# Patient Record
Sex: FEMALE | Race: WHITE | NOT HISPANIC OR LATINO
[De-identification: names, ages, dates, MRNs, and addresses within clinical notes are randomized per-mention and may not be internally consistent; named-entity substitution may affect disease eponyms.]

---

## 2017-05-04 ENCOUNTER — APPOINTMENT (OUTPATIENT)
Dept: SURGICAL ONCOLOGY | Facility: CLINIC | Age: 57
End: 2017-05-04

## 2017-05-04 VITALS
BODY MASS INDEX: 27.46 KG/M2 | DIASTOLIC BLOOD PRESSURE: 80 MMHG | HEIGHT: 63 IN | HEART RATE: 63 BPM | WEIGHT: 155 LBS | SYSTOLIC BLOOD PRESSURE: 118 MMHG

## 2017-05-04 DIAGNOSIS — T80.92XA UNSPECIFIED TRANSFUSION REACTION, INITIAL ENCOUNTER: ICD-10-CM

## 2017-05-04 DIAGNOSIS — M79.1 MYALGIA: ICD-10-CM

## 2017-05-04 DIAGNOSIS — H26.9 UNSPECIFIED CATARACT: ICD-10-CM

## 2017-05-04 DIAGNOSIS — E07.9 DISORDER OF THYROID, UNSPECIFIED: ICD-10-CM

## 2017-05-04 DIAGNOSIS — K44.9 DIAPHRAGMATIC HERNIA W/OUT OBSTRUCTION OR GANGRENE: ICD-10-CM

## 2017-05-04 DIAGNOSIS — D68.9 COAGULATION DEFECT, UNSPECIFIED: ICD-10-CM

## 2017-05-04 DIAGNOSIS — C44.90 UNSPECIFIED MALIGNANT NEOPLASM OF SKIN, UNSPECIFIED: ICD-10-CM

## 2017-05-04 DIAGNOSIS — Z87.01 PERSONAL HISTORY OF PNEUMONIA (RECURRENT): ICD-10-CM

## 2017-05-04 DIAGNOSIS — Z78.9 OTHER SPECIFIED HEALTH STATUS: ICD-10-CM

## 2017-05-04 DIAGNOSIS — N15.9 RENAL TUBULO-INTERSTITIAL DISEASE, UNSPECIFIED: ICD-10-CM

## 2017-05-04 DIAGNOSIS — R68.89 OTHER GENERAL SYMPTOMS AND SIGNS: ICD-10-CM

## 2017-07-19 ENCOUNTER — FORM ENCOUNTER (OUTPATIENT)
Age: 57
End: 2017-07-19

## 2017-07-20 ENCOUNTER — APPOINTMENT (OUTPATIENT)
Dept: ULTRASOUND IMAGING | Facility: CLINIC | Age: 57
End: 2017-07-20

## 2017-07-20 ENCOUNTER — APPOINTMENT (OUTPATIENT)
Dept: MAMMOGRAPHY | Facility: CLINIC | Age: 57
End: 2017-07-20

## 2017-07-20 ENCOUNTER — OUTPATIENT (OUTPATIENT)
Dept: OUTPATIENT SERVICES | Facility: HOSPITAL | Age: 57
LOS: 1 days | End: 2017-07-20
Payer: COMMERCIAL

## 2017-07-20 DIAGNOSIS — Z00.8 ENCOUNTER FOR OTHER GENERAL EXAMINATION: ICD-10-CM

## 2017-07-20 PROCEDURE — 77063 BREAST TOMOSYNTHESIS BI: CPT

## 2017-07-20 PROCEDURE — 77067 SCR MAMMO BI INCL CAD: CPT

## 2017-07-20 PROCEDURE — 76641 ULTRASOUND BREAST COMPLETE: CPT

## 2017-08-07 ENCOUNTER — APPOINTMENT (OUTPATIENT)
Dept: SURGICAL ONCOLOGY | Facility: CLINIC | Age: 57
End: 2017-08-07
Payer: COMMERCIAL

## 2017-08-07 VITALS
WEIGHT: 158 LBS | OXYGEN SATURATION: 96 % | HEIGHT: 63 IN | BODY MASS INDEX: 28 KG/M2 | HEART RATE: 73 BPM | RESPIRATION RATE: 15 BRPM | DIASTOLIC BLOOD PRESSURE: 73 MMHG | SYSTOLIC BLOOD PRESSURE: 117 MMHG

## 2017-08-07 PROCEDURE — 99214 OFFICE O/P EST MOD 30 MIN: CPT

## 2017-11-27 ENCOUNTER — APPOINTMENT (OUTPATIENT)
Dept: ULTRASOUND IMAGING | Facility: HOSPITAL | Age: 57
End: 2017-11-27
Payer: COMMERCIAL

## 2017-11-27 ENCOUNTER — OUTPATIENT (OUTPATIENT)
Dept: OUTPATIENT SERVICES | Facility: HOSPITAL | Age: 57
LOS: 1 days | End: 2017-11-27
Payer: COMMERCIAL

## 2017-11-27 PROCEDURE — 76642 ULTRASOUND BREAST LIMITED: CPT

## 2017-11-27 PROCEDURE — 76642 ULTRASOUND BREAST LIMITED: CPT | Mod: 26,LT

## 2017-11-29 DIAGNOSIS — N60.02 SOLITARY CYST OF LEFT BREAST: ICD-10-CM

## 2017-12-11 ENCOUNTER — APPOINTMENT (OUTPATIENT)
Dept: SURGICAL ONCOLOGY | Facility: CLINIC | Age: 57
End: 2017-12-11

## 2018-03-02 ENCOUNTER — APPOINTMENT (OUTPATIENT)
Dept: DERMATOLOGY | Facility: CLINIC | Age: 58
End: 2018-03-02
Payer: COMMERCIAL

## 2018-03-02 DIAGNOSIS — D18.00 HEMANGIOMA UNSPECIFIED SITE: ICD-10-CM

## 2018-03-02 DIAGNOSIS — Z86.39 PERSONAL HISTORY OF OTHER ENDOCRINE, NUTRITIONAL AND METABOLIC DISEASE: ICD-10-CM

## 2018-03-02 DIAGNOSIS — L21.9 SEBORRHEIC DERMATITIS, UNSPECIFIED: ICD-10-CM

## 2018-03-02 DIAGNOSIS — L81.4 OTHER MELANIN HYPERPIGMENTATION: ICD-10-CM

## 2018-03-02 DIAGNOSIS — Z84.0 FAMILY HISTORY OF DISEASES OF THE SKIN AND SUBCUTANEOUS TISSUE: ICD-10-CM

## 2018-03-02 DIAGNOSIS — L82.1 OTHER SEBORRHEIC KERATOSIS: ICD-10-CM

## 2018-03-02 PROCEDURE — 99214 OFFICE O/P EST MOD 30 MIN: CPT

## 2018-04-13 ENCOUNTER — APPOINTMENT (OUTPATIENT)
Dept: DERMATOLOGY | Facility: CLINIC | Age: 58
End: 2018-04-13

## 2018-05-07 ENCOUNTER — APPOINTMENT (OUTPATIENT)
Dept: SURGICAL ONCOLOGY | Facility: CLINIC | Age: 58
End: 2018-05-07

## 2019-09-16 ENCOUNTER — APPOINTMENT (OUTPATIENT)
Dept: SURGICAL ONCOLOGY | Facility: CLINIC | Age: 59
End: 2019-09-16
Payer: COMMERCIAL

## 2019-09-16 VITALS
HEIGHT: 63 IN | SYSTOLIC BLOOD PRESSURE: 125 MMHG | BODY MASS INDEX: 27.46 KG/M2 | TEMPERATURE: 98.1 F | HEART RATE: 73 BPM | WEIGHT: 155 LBS | DIASTOLIC BLOOD PRESSURE: 76 MMHG | RESPIRATION RATE: 17 BRPM

## 2019-09-16 PROCEDURE — 99213 OFFICE O/P EST LOW 20 MIN: CPT

## 2019-09-16 RX ORDER — MOMETASONE FUROATE 1 MG/ML
0.1 SOLUTION TOPICAL
Qty: 60 | Refills: 1 | Status: DISCONTINUED | COMMUNITY
Start: 2018-03-09 | End: 2019-09-16

## 2019-09-16 RX ORDER — LEVOTHYROXINE SODIUM 150 UG/1
150 TABLET ORAL
Refills: 0 | Status: ACTIVE | COMMUNITY

## 2019-09-16 RX ORDER — PIMECROLIMUS 10 MG/G
1 CREAM TOPICAL
Qty: 60 | Refills: 3 | Status: DISCONTINUED | COMMUNITY
Start: 2018-03-02 | End: 2019-09-16

## 2019-09-16 NOTE — PHYSICAL EXAM
[Normal] : supple, no neck mass and thyroid not enlarged [Normal Supraclavicular Lymph Nodes] : normal supraclavicular lymph nodes [Normal Axillary Lymph Nodes] : normal axillary lymph nodes [Normal] : oriented to person, place and time, with appropriate affect [FreeTextEntry1] : RC present for exam.  [de-identified] : Normal S1, S2. Regular rate and rhythm. [de-identified] : Complete breast exam performed in supine and upright positions. No palpable masses, tenderness, nipple discharge, inversion, deviation or enlarged axillary or supraclavicular lymph nodes.  [de-identified] : Clear breath sounds bilaterally, normal respiratory effort.

## 2019-09-16 NOTE — HISTORY OF PRESENT ILLNESS
[de-identified] : 55-year-old woman with history of extremely dense breasts. multiple cyst aspirations. \par \par She underwent a bilateral mammo/sono Aug 2019 which was without evidence of malignancy (Birads 1). \par Today she is without any complaints. Denies palpable breast masses, nipple discharge, skin changes, inversion or breast pain. Denies constitutional symptoms.\par \par Family history of breast cancer in her mother in her late 70s.

## 2019-09-16 NOTE — ASSESSMENT
[FreeTextEntry1] : Impression: \par Dense breasts\par Breast imaging failed to identify any suspicious lesions. \par No suspicious lesions on exam.\par \par Plan:\par Continue yearly surveillance\par Mammo/sono Aug 2020

## 2021-04-01 ENCOUNTER — APPOINTMENT (OUTPATIENT)
Dept: OTOLARYNGOLOGY | Facility: CLINIC | Age: 61
End: 2021-04-01
Payer: COMMERCIAL

## 2021-04-01 VITALS
BODY MASS INDEX: 28.35 KG/M2 | DIASTOLIC BLOOD PRESSURE: 74 MMHG | TEMPERATURE: 98 F | SYSTOLIC BLOOD PRESSURE: 108 MMHG | HEIGHT: 63 IN | WEIGHT: 160 LBS | HEART RATE: 78 BPM

## 2021-04-01 PROCEDURE — 99204 OFFICE O/P NEW MOD 45 MIN: CPT | Mod: 25

## 2021-04-01 PROCEDURE — 92557 COMPREHENSIVE HEARING TEST: CPT

## 2021-04-01 PROCEDURE — 99072 ADDL SUPL MATRL&STAF TM PHE: CPT

## 2021-04-01 PROCEDURE — 31231 NASAL ENDOSCOPY DX: CPT

## 2021-04-01 PROCEDURE — 92567 TYMPANOMETRY: CPT

## 2021-04-01 RX ORDER — PREDNISONE 10 MG/1
10 TABLET ORAL TWICE DAILY
Qty: 15 | Refills: 2 | Status: ACTIVE | COMMUNITY
Start: 2021-04-01 | End: 1900-01-01

## 2021-04-01 RX ORDER — MAGNESIUM OXIDE 500 MG
500 TABLET ORAL
Refills: 0 | Status: ACTIVE | COMMUNITY

## 2021-04-01 RX ORDER — FLUTICASONE PROPIONATE 50 MCG
50 SPRAY, SUSPENSION NASAL
Refills: 0 | Status: ACTIVE | COMMUNITY

## 2021-04-01 RX ORDER — CETIRIZINE HCL 10 MG
TABLET ORAL
Refills: 0 | Status: ACTIVE | COMMUNITY

## 2021-04-01 RX ORDER — ECHINACEA 400 MG
CAPSULE ORAL
Refills: 0 | Status: ACTIVE | COMMUNITY

## 2021-04-01 NOTE — PROCEDURE
[FreeTextEntry6] : Indication for procedure:  Unable to adequately examine mid and posterior nasal cavity with anterior rhinoscopy\par Sinus endoscope # 47\par Nasal septum exam shows septal deviation to the left at valve 3 plus\par The inferior nasal turbinates are moderately hypertrophic in size bilaterally.\par The sinus endoscope was introduced into the right nares\par exam right middle meatus reveals no mucopus or inflammation.  The right middle turbinate is WNL.\par The scope was advanced and the sphenoethmoid region was inspected.\par The superior meatus, superior turbinate and nasal vault are unremarkable.  The nasopharynx is unremarkable without inflammation or mass.\par The sinus endoscope was introduced into the left nares and\par exam left middle meatus reveals no mucopus or inflammation.  The left middle turbinate is WNL.\par The scope was advanced and the sphenoethmoid region was inspected.\par The left superior meatus and nasal vault are unremarkable.\par

## 2021-04-01 NOTE — CONSULT LETTER
[Consult Letter:] : I had the pleasure of evaluating your patient, [unfilled]. [Please see my note below.] : Please see my note below. [Consult Closing:] : Thank you very much for allowing me to participate in the care of this patient.  If you have any questions, please do not hesitate to contact me. [Sincerely,] : Sincerely, [FreeTextEntry1] : Dear Dr. ALONA ZIMMERMAN,\par \par Thank you for your kind referral. Please refer to my enclosed office notes for A DESTINEE RAMACHANDRANO . If there are any questions free to contact me.\par  [FreeTextEntry3] : Keyshawn Jernigan MD, FACS\par

## 2021-04-01 NOTE — ASSESSMENT
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

## 2021-04-01 NOTE — REVIEW OF SYSTEMS
[Sneezing] : sneezing [Seasonal Allergies] : seasonal allergies [Post Nasal Drip] : post nasal drip [Ear Pain] : ear pain [Dizziness] : dizziness [Recurrent Ear Infections] : recurrent ear infections [Nasal Congestion] : nasal congestion [Sinus Pain] : sinus pain [Sinus Pressure] : sinus pressure [Negative] : Heme/Lymph [Patient Intake Form Reviewed] : Patient intake form was reviewed [FreeTextEntry1] : headache  joint aches  watery eyes   muscle aches  hives  skin/hair changes

## 2021-04-01 NOTE — HISTORY OF PRESENT ILLNESS
[de-identified] : ear infection 6 mo ago\par rx z pack and medrol x 2\par relapse fluid in ears past few weeks w marked imbalance\par doxycycline recently using fluticasone and zyrtec\par no nasal dc but co facial pressure clear nasal drip

## 2021-04-20 ENCOUNTER — APPOINTMENT (OUTPATIENT)
Dept: SURGICAL ONCOLOGY | Facility: CLINIC | Age: 61
End: 2021-04-20
Payer: COMMERCIAL

## 2021-04-20 VITALS
SYSTOLIC BLOOD PRESSURE: 130 MMHG | HEIGHT: 63 IN | OXYGEN SATURATION: 96 % | DIASTOLIC BLOOD PRESSURE: 86 MMHG | WEIGHT: 158 LBS | RESPIRATION RATE: 17 BRPM | BODY MASS INDEX: 28 KG/M2 | HEART RATE: 71 BPM

## 2021-04-20 PROCEDURE — 99213 OFFICE O/P EST LOW 20 MIN: CPT

## 2021-04-20 NOTE — ASSESSMENT
[FreeTextEntry1] : Impression: \par Dense breasts\par Mammo/sono 8/2020- BIRADS 2 \par No suspicious lesions on exam.\par \par Plan:\par Continue yearly surveillance\par Mammo/sono Aug 2021 (ordered)

## 2021-04-20 NOTE — PHYSICAL EXAM
[Normal] : supple, no neck mass and thyroid not enlarged [Normal Supraclavicular Lymph Nodes] : normal supraclavicular lymph nodes [Normal Axillary Lymph Nodes] : normal axillary lymph nodes [Normal] : oriented to person, place and time, with appropriate affect [FreeTextEntry1] : HENRY present for exam.  [de-identified] : Normal S1, S2. Regular rate and rhythm. [de-identified] : Complete breast exam performed in supine and upright positions. No palpable masses, tenderness, nipple discharge, inversion, deviation or enlarged axillary or supraclavicular lymph nodes.  [de-identified] : Clear breath sounds bilaterally, normal respiratory effort.

## 2021-04-20 NOTE — HISTORY OF PRESENT ILLNESS
[de-identified] : Ms. WANDER KURTZ  is a 61 year  old female  presenting for a follow up visit. Last seen 9/2019. \par \par History of extremely dense breasts and multiple cyst aspirations. \par \par She underwent a bilateral mammo/sono Aug 2020 @ [facility] which was without evidence of malignancy (Birads 2). \par \par Denies palpable breast masses, nipple discharge, skin changes, inversion or breast pain. Denies constitutional symptoms.\par \par Family history of breast cancer in her mother in her late 70s.

## 2021-06-09 ENCOUNTER — APPOINTMENT (OUTPATIENT)
Dept: ORTHOPEDIC SURGERY | Facility: CLINIC | Age: 61
End: 2021-06-09
Payer: COMMERCIAL

## 2021-06-09 VITALS
HEIGHT: 63 IN | WEIGHT: 160 LBS | BODY MASS INDEX: 28.35 KG/M2 | TEMPERATURE: 97.7 F | SYSTOLIC BLOOD PRESSURE: 152 MMHG | DIASTOLIC BLOOD PRESSURE: 90 MMHG | HEART RATE: 80 BPM

## 2021-06-09 DIAGNOSIS — M43.17 SPONDYLOLISTHESIS, LUMBOSACRAL REGION: ICD-10-CM

## 2021-06-09 DIAGNOSIS — M51.36 OTHER INTERVERTEBRAL DISC DEGENERATION, LUMBAR REGION: ICD-10-CM

## 2021-06-09 PROCEDURE — 99072 ADDL SUPL MATRL&STAF TM PHE: CPT

## 2021-06-09 PROCEDURE — 72110 X-RAY EXAM L-2 SPINE 4/>VWS: CPT

## 2021-06-09 PROCEDURE — 99204 OFFICE O/P NEW MOD 45 MIN: CPT

## 2021-11-29 ENCOUNTER — TRANSCRIPTION ENCOUNTER (OUTPATIENT)
Age: 61
End: 2021-11-29

## 2022-04-15 ENCOUNTER — APPOINTMENT (OUTPATIENT)
Dept: OTOLARYNGOLOGY | Facility: CLINIC | Age: 62
End: 2022-04-15
Payer: COMMERCIAL

## 2022-04-15 VITALS — HEIGHT: 63 IN | TEMPERATURE: 97.8 F | BODY MASS INDEX: 27.46 KG/M2 | WEIGHT: 155 LBS

## 2022-04-15 DIAGNOSIS — G50.1 ATYPICAL FACIAL PAIN: ICD-10-CM

## 2022-04-15 PROCEDURE — 92567 TYMPANOMETRY: CPT

## 2022-04-15 PROCEDURE — 31231 NASAL ENDOSCOPY DX: CPT

## 2022-04-15 PROCEDURE — 92557 COMPREHENSIVE HEARING TEST: CPT

## 2022-04-15 PROCEDURE — 99214 OFFICE O/P EST MOD 30 MIN: CPT | Mod: 25

## 2022-04-15 RX ORDER — PREDNISONE 10 MG/1
10 TABLET ORAL TWICE DAILY
Qty: 20 | Refills: 2 | Status: ACTIVE | COMMUNITY
Start: 2022-04-15 | End: 1900-01-01

## 2022-04-15 NOTE — ASSESSMENT
[FreeTextEntry1] : recurrent otitis and sinusitis\par rec ct sinuses\par saline irrigations\par trial azelastine spray\par pred 10 bid\par audio au loss 6000 8000 hz stable from last audio

## 2022-04-15 NOTE — PROCEDURE
[FreeTextEntry6] : Indication for procedure:  Unable to adequately examine mid and posterior nasal cavity with anterior rhinoscopy\par The patient has recurrent nasal congsetion\par \par Sinus endoscope # 86\par Nasal septum exam shows septal deviation to the rt at valve 3 plus\par The inferior nasal turbinates are moderately hypertrophic in size bilaterally.\par The sinus endoscope was introduced into the right nares\par exam right middle meatus reveals no mucopus or inflammation.  The right middle turbinate is WNL.\par The scope was advanced and the sphenoethmoid region was inspected.\par The superior meatus, superior turbinate and nasal vault are unremarkable.  The nasopharynx is unremarkable without inflammation or mass.\par The sinus endoscope was introduced into the left nares and\par exam left middle meatus reveals no mucopus or inflammation.  The left middle turbinate is WNL.\par The scope was advanced and the sphenoethmoid region was inspected.\par The left superior meatus and nasal vault are unremarkable.\par excessive mucous nasopharynx

## 2022-04-15 NOTE — HISTORY OF PRESENT ILLNESS
[de-identified] : co recurring ear infections  rx antibiotics and pred\par 3 episodes past 8 mo, last rx 1 mo ago\par doxy and pred recently\par co nasal congestion and clear nasal drip\par zyrtec and flonase\par sinusitis multiple x past 2 years\par allergies

## 2022-04-15 NOTE — REASON FOR VISIT
[Subsequent Evaluation] : a subsequent evaluation for [Sinusitis] : sinusitis [FreeTextEntry2] : ears

## 2022-04-15 NOTE — REVIEW OF SYSTEMS
[Nasal Congestion] : nasal congestion [Recurrent Sinus Infections] : recurrent sinus infections [Problem Snoring] : problem snoring [Sinus Pain] : sinus pain [Discolored Nasal Discharge] : discolored nasal discharge [Negative] : Heme/Lymph [Patient Intake Form Reviewed] : Patient intake form was reviewed [de-identified] : clogged

## 2022-05-20 ENCOUNTER — NON-APPOINTMENT (OUTPATIENT)
Age: 62
End: 2022-05-20

## 2022-05-20 RX ORDER — DOXYCYCLINE 100 MG/1
100 CAPSULE ORAL TWICE DAILY
Qty: 28 | Refills: 1 | Status: ACTIVE | COMMUNITY
Start: 2022-05-20 | End: 1900-01-01

## 2022-06-11 ENCOUNTER — NON-APPOINTMENT (OUTPATIENT)
Age: 62
End: 2022-06-11

## 2022-09-05 ENCOUNTER — NON-APPOINTMENT (OUTPATIENT)
Age: 62
End: 2022-09-05

## 2022-09-13 ENCOUNTER — APPOINTMENT (OUTPATIENT)
Dept: SURGICAL ONCOLOGY | Facility: CLINIC | Age: 62
End: 2022-09-13

## 2022-09-13 VITALS
HEART RATE: 85 BPM | TEMPERATURE: 97.7 F | WEIGHT: 160 LBS | RESPIRATION RATE: 16 BRPM | OXYGEN SATURATION: 95 % | HEIGHT: 63 IN | SYSTOLIC BLOOD PRESSURE: 109 MMHG | DIASTOLIC BLOOD PRESSURE: 70 MMHG | BODY MASS INDEX: 28.35 KG/M2

## 2022-09-13 PROCEDURE — 99214 OFFICE O/P EST MOD 30 MIN: CPT

## 2022-09-13 NOTE — HISTORY OF PRESENT ILLNESS
[de-identified] : Ms. WANDER KURTZ  is a 62 year old female presenting for a follow up visit.\par \par History of extremely dense breasts and multiple cyst aspirations. \par \par She underwent a bilateral mammo/sono Aug 2022 @ [facility] which was suggestive of summation of shadows within the posterior and central margin of the right breast. Negative US. 6 month follow up  (Birads 3). \par \par Denies palpable breast masses, nipple discharge, skin changes, inversion or breast pain. Denies constitutional symptoms.\par \par Family history of breast cancer in her mother in her late 70s.

## 2022-09-13 NOTE — ASSESSMENT
[FreeTextEntry1] : Impression: \par Dense breasts\par Mammo/sono 8/2022- BIRADS 3- f/u in 6 months  \par No suspicious lesions on exam.\par \par Plan:\par Continue yearly surveillance\par Mammo/sono Feb 2023 (ordered)\par \par \par All medical entries were at my, Dr. Timi Britt, direction. I have reviewed the chart and agree that the record accurately reflects my personal performance of the history, physical exam, assessment and plan. Our office Nurse Practitioner was present of the duration of the office visit.

## 2022-09-13 NOTE — PHYSICAL EXAM
[Normal] : supple, no neck mass and thyroid not enlarged [Normal Supraclavicular Lymph Nodes] : normal supraclavicular lymph nodes [Normal Axillary Lymph Nodes] : normal axillary lymph nodes [Normal] : oriented to person, place and time, with appropriate affect [FreeTextEntry1] : SC present for exam.  [de-identified] : Normal S1, S2. Regular rate and rhythm. [de-identified] : Complete breast exam performed in supine and upright positions. No palpable masses, tenderness, nipple discharge, inversion, deviation or enlarged axillary or supraclavicular lymph nodes.  [de-identified] : Clear breath sounds bilaterally, normal respiratory effort.

## 2022-10-06 ENCOUNTER — NON-APPOINTMENT (OUTPATIENT)
Age: 62
End: 2022-10-06

## 2022-11-01 RX ORDER — AZELASTINE HYDROCHLORIDE 137 UG/1
0.1 SPRAY, METERED NASAL TWICE DAILY
Qty: 3 | Refills: 3 | Status: ACTIVE | COMMUNITY
Start: 2022-04-15 | End: 1900-01-01

## 2022-12-29 ENCOUNTER — APPOINTMENT (OUTPATIENT)
Dept: OTOLARYNGOLOGY | Facility: CLINIC | Age: 62
End: 2022-12-29

## 2022-12-29 VITALS — WEIGHT: 160 LBS | HEIGHT: 63 IN | TEMPERATURE: 96.7 F | BODY MASS INDEX: 28.35 KG/M2

## 2022-12-29 DIAGNOSIS — H65.22 CHRONIC SEROUS OTITIS MEDIA, LEFT EAR: ICD-10-CM

## 2022-12-29 DIAGNOSIS — J32.2 CHRONIC ETHMOIDAL SINUSITIS: ICD-10-CM

## 2022-12-29 DIAGNOSIS — H90.12 CONDUCTIVE HEARING LOSS, UNILATERAL, LEFT EAR, WITH UNRESTRICTED HEARING ON THE CONTRALATERAL SIDE: ICD-10-CM

## 2022-12-29 DIAGNOSIS — J06.9 ACUTE UPPER RESPIRATORY INFECTION, UNSPECIFIED: ICD-10-CM

## 2022-12-29 DIAGNOSIS — H92.02 OTALGIA, LEFT EAR: ICD-10-CM

## 2022-12-29 PROCEDURE — 99213 OFFICE O/P EST LOW 20 MIN: CPT | Mod: 25

## 2022-12-29 PROCEDURE — 31231 NASAL ENDOSCOPY DX: CPT

## 2022-12-29 RX ORDER — PREDNISONE 10 MG/1
10 TABLET ORAL
Qty: 18 | Refills: 3 | Status: ACTIVE | COMMUNITY
Start: 2022-12-29 | End: 1900-01-01

## 2022-12-29 RX ORDER — DOXYCYCLINE 100 MG/1
100 CAPSULE ORAL TWICE DAILY
Qty: 20 | Refills: 1 | Status: ACTIVE | COMMUNITY
Start: 2022-12-29 | End: 1900-01-01

## 2022-12-29 NOTE — PHYSICAL EXAM
[Midline] : trachea located in midline position [Normal] : no rashes [de-identified] : left tm red

## 2022-12-29 NOTE — REVIEW OF SYSTEMS
[Seasonal Allergies] : seasonal allergies [Ear Pain] : ear pain [Ear Noises] : ear noises [Sinus Pressure] : sinus pressure [Hoarseness] : hoarseness [Negative] : Heme/Lymph [Patient Intake Form Reviewed] : Patient intake form was reviewed [de-identified] : discomfort since last night. Patient was flying back in

## 2022-12-29 NOTE — ASSESSMENT
[FreeTextEntry1] : acute om left\par sinusitis\par laryngitis\par doxycycline 100\par pred 10 #18\par fu prn

## 2023-01-16 ENCOUNTER — APPOINTMENT (OUTPATIENT)
Dept: OTOLARYNGOLOGY | Facility: CLINIC | Age: 63
End: 2023-01-16

## 2023-03-22 ENCOUNTER — APPOINTMENT (OUTPATIENT)
Dept: SURGICAL ONCOLOGY | Facility: CLINIC | Age: 63
End: 2023-03-22
Payer: COMMERCIAL

## 2023-03-22 VITALS
OXYGEN SATURATION: 98 % | HEART RATE: 72 BPM | RESPIRATION RATE: 16 BRPM | HEIGHT: 63 IN | WEIGHT: 164 LBS | BODY MASS INDEX: 29.06 KG/M2 | DIASTOLIC BLOOD PRESSURE: 89 MMHG | SYSTOLIC BLOOD PRESSURE: 144 MMHG

## 2023-03-22 PROCEDURE — 99213 OFFICE O/P EST LOW 20 MIN: CPT

## 2023-03-22 NOTE — ASSESSMENT
[FreeTextEntry1] : Impression: \par Dense breasts\par Mammo/sono 8/2022- BIRADS 3- f/u in 6 months  \par MMG/US (right breast) 3/2023- BI-RADS 3- return yearly \par No suspicious lesions on exam.\par \par Plan:\par Continue yearly surveillance\par MMG/US Aug 2023 - ordered\par \par \par All medical entries were at my, Dr. Timi Britt, direction. I have reviewed the chart and agree that the record accurately reflects my personal performance of the history, physical exam, assessment and plan. Our office Nurse Practitioner was present of the duration of the office visit.

## 2023-03-22 NOTE — HISTORY OF PRESENT ILLNESS
[de-identified] : Ms. WANDER KURTZ  is a 62 year old female presenting for a follow up visit.\par \par History of extremely dense breasts and multiple cyst aspirations. \par \par She underwent a bilateral mammo/sono Aug 2022 @ [facility] which was suggestive of summation of shadows within the posterior and central margin of the right breast. Negative US. 6 month follow up  (Bi-rads 3). \par \par Right MMG/US on 2/21/23 shows resolution of previously noted oval asymmetry in the right breast. BI-RADS 3 - yearly MMG/US\par \par Denies palpable breast masses, nipple discharge, skin changes, inversion or breast pain. Denies constitutional symptoms.\par \par Family history of breast cancer in her mother in her late 70s.

## 2023-04-24 ENCOUNTER — APPOINTMENT (OUTPATIENT)
Dept: OTOLARYNGOLOGY | Facility: CLINIC | Age: 63
End: 2023-04-24
Payer: COMMERCIAL

## 2023-04-24 VITALS — WEIGHT: 164 LBS | BODY MASS INDEX: 29.06 KG/M2 | HEIGHT: 63 IN | TEMPERATURE: 96.5 F

## 2023-04-24 PROCEDURE — 99213 OFFICE O/P EST LOW 20 MIN: CPT

## 2023-04-24 NOTE — ASSESSMENT
[FreeTextEntry1] : allergic rhintis\par hearing loss stable\par  20 minutes spent with patient with exam and counseling excluding time for any procedure.\par

## 2023-04-24 NOTE — HISTORY OF PRESENT ILLNESS
[de-identified] : fu ears\par now clear\par hx hearing loss  high frequency  seems stable\par seasonal allergies using azelastine

## 2023-04-27 ENCOUNTER — RX ONLY (RX ONLY)
Age: 63
End: 2023-04-27

## 2023-04-27 ENCOUNTER — OFFICE (OUTPATIENT)
Dept: URBAN - METROPOLITAN AREA CLINIC 102 | Facility: CLINIC | Age: 63
Setting detail: OPHTHALMOLOGY
End: 2023-04-27
Payer: COMMERCIAL

## 2023-04-27 DIAGNOSIS — H31.002: ICD-10-CM

## 2023-04-27 DIAGNOSIS — H25.13: ICD-10-CM

## 2023-04-27 PROCEDURE — 99204 OFFICE O/P NEW MOD 45 MIN: CPT | Performed by: OPHTHALMOLOGY

## 2023-04-27 ASSESSMENT — REFRACTION_CURRENTRX
OS_SPHERE: -1.00
OD_AXIS: 180
OD_ADD: +2.00
OD_OVR_VA: 20/
OD_VPRISM_DIRECTION: PROGS
OS_CYLINDER: -0.50
OD_SPHERE: -1.00
OS_VPRISM_DIRECTION: PROGS
OD_CYLINDER: -0.25
OS_AXIS: 180
OS_ADD: +2.00
OS_OVR_VA: 20/

## 2023-04-27 ASSESSMENT — KERATOMETRY
OS_AXISANGLE_DEGREES: 090
OD_K2POWER_DIOPTERS: 44.50
OS_K1POWER_DIOPTERS: 44.50
OD_K1POWER_DIOPTERS: 44.50
METHOD_AUTO_MANUAL: AUTO
OD_AXISANGLE_DEGREES: 90
OS_K2POWER_DIOPTERS: 44.50

## 2023-04-27 ASSESSMENT — REFRACTION_AUTOREFRACTION
OD_CYLINDER: -0.75
OS_SPHERE: -0.75
OD_AXIS: 170
OD_SPHERE: -0.75
OS_AXIS: 169
OS_CYLINDER: -0.75

## 2023-04-27 ASSESSMENT — AXIALLENGTH_DERIVED
OS_AL: 23.6631
OD_AL: 23.6631
OD_AL: 23.7615

## 2023-04-27 ASSESSMENT — CONFRONTATIONAL VISUAL FIELD TEST (CVF)
OS_FINDINGS: FULL
OD_FINDINGS: FULL

## 2023-04-27 ASSESSMENT — SPHEQUIV_DERIVED
OD_SPHEQUIV: -1.125
OS_SPHEQUIV: -1.125
OD_SPHEQUIV: -1.375

## 2023-04-27 ASSESSMENT — REFRACTION_MANIFEST
OD_AXIS: 165
OD_VA1: 20/80
OD_CYLINDER: -0.75
OD_SPHERE: -1.00

## 2023-04-27 ASSESSMENT — TONOMETRY
OD_IOP_MMHG: 16
OS_IOP_MMHG: 18

## 2023-04-27 ASSESSMENT — VISUAL ACUITY
OD_BCVA: 20/25-2
OS_BCVA: 20/150-

## 2023-05-22 ENCOUNTER — OFFICE (OUTPATIENT)
Dept: URBAN - METROPOLITAN AREA CLINIC 102 | Facility: CLINIC | Age: 63
Setting detail: OPHTHALMOLOGY
End: 2023-05-22
Payer: COMMERCIAL

## 2023-05-22 DIAGNOSIS — H25.11: ICD-10-CM

## 2023-05-22 DIAGNOSIS — H25.13: ICD-10-CM

## 2023-05-22 PROCEDURE — 99213 OFFICE O/P EST LOW 20 MIN: CPT | Performed by: OPHTHALMOLOGY

## 2023-05-22 PROCEDURE — 92136 OPHTHALMIC BIOMETRY: CPT | Performed by: OPHTHALMOLOGY

## 2023-05-22 ASSESSMENT — KERATOMETRY
OS_K1K2_AVERAGE: 44.125
OD_AXISANGLE2_DEGREES: 101
OS_CYLPOWER_DEGREES: 0.25
OD_AXISANGLE_DEGREES: 101
OS_K2POWER_DIOPTERS: 44.25
OS_AXISANGLE_DEGREES: 174
OS_CYLAXISANGLE_DEGREES: 084
OS_K2POWER_DIOPTERS: 44.25
OS_AXISANGLE2_DEGREES: 084
OD_AXISANGLE_DEGREES: 11
OS_K1POWER_DIOPTERS: 44.00
OS_AXISANGLE_DEGREES: 084
OD_CYLAXISANGLE_DEGREES: 101
OD_K1POWER_DIOPTERS: 44.50
OD_K1K2_AVERAGE: 44.625
OD_K2POWER_DIOPTERS: 44.75
OD_K1POWER_DIOPTERS: 44.50
OD_CYLPOWER_DEGREES: 0.25
OD_K2POWER_DIOPTERS: 44.75
OS_K1POWER_DIOPTERS: 44.00
METHOD_AUTO_MANUAL: AUTO

## 2023-05-22 ASSESSMENT — REFRACTION_CURRENTRX
OS_VPRISM_DIRECTION: PROGS
OD_CYLINDER: -0.25
OD_VPRISM_DIRECTION: PROGS
OS_AXIS: 180
OS_SPHERE: -1.00
OS_ADD: +2.00
OD_SPHERE: -1.00
OS_CYLINDER: -0.50
OD_AXIS: 180
OD_OVR_VA: 20/
OD_ADD: +2.00
OS_OVR_VA: 20/

## 2023-05-22 ASSESSMENT — SPHEQUIV_DERIVED
OD_SPHEQUIV: -1.375
OS_SPHEQUIV: -0.875

## 2023-05-22 ASSESSMENT — REFRACTION_MANIFEST
OD_VA1: 20/80
OD_AXIS: 165
OD_CYLINDER: -0.75
OD_SPHERE: -1.00

## 2023-05-22 ASSESSMENT — REFRACTION_AUTOREFRACTION
OS_CYLINDER: -0.75
OS_SPHERE: -0.50
OD_SPHERE: UNABLE
OD_AXIS: UNABLE
OD_CYLINDER: UNABLE
OS_AXIS: 167

## 2023-05-22 ASSESSMENT — CONFRONTATIONAL VISUAL FIELD TEST (CVF)
OD_FINDINGS: FULL
OS_FINDINGS: FULL

## 2023-05-22 ASSESSMENT — VISUAL ACUITY
OS_BCVA: 20/250
OD_BCVA: 20/30

## 2023-05-22 ASSESSMENT — TONOMETRY
OD_IOP_MMHG: 16
OS_IOP_MMHG: 16

## 2023-05-22 ASSESSMENT — AXIALLENGTH_DERIVED
OS_AL: 23.7038
OD_AL: 23.715

## 2023-06-08 ENCOUNTER — NON-APPOINTMENT (OUTPATIENT)
Age: 63
End: 2023-06-08

## 2023-07-05 ENCOUNTER — ASC (OUTPATIENT)
Dept: URBAN - METROPOLITAN AREA SURGERY 8 | Facility: SURGERY | Age: 63
Setting detail: OPHTHALMOLOGY
End: 2023-07-05
Payer: COMMERCIAL

## 2023-07-05 DIAGNOSIS — H25.11: ICD-10-CM

## 2023-07-05 PROCEDURE — 66984 XCAPSL CTRC RMVL W/O ECP: CPT | Performed by: OPHTHALMOLOGY

## 2023-07-06 ENCOUNTER — OFFICE (OUTPATIENT)
Dept: URBAN - METROPOLITAN AREA CLINIC 102 | Facility: CLINIC | Age: 63
Setting detail: OPHTHALMOLOGY
End: 2023-07-06
Payer: COMMERCIAL

## 2023-07-06 DIAGNOSIS — Z96.1: ICD-10-CM

## 2023-07-06 PROCEDURE — 99024 POSTOP FOLLOW-UP VISIT: CPT | Performed by: OPHTHALMOLOGY

## 2023-07-06 ASSESSMENT — AXIALLENGTH_DERIVED
OD_AL: 23.855
OS_AL: 23.7066
OD_AL: 23.1308

## 2023-07-06 ASSESSMENT — REFRACTION_AUTOREFRACTION
OD_AXIS: 111
OS_AXIS: 174
OS_CYLINDER: -1.00
OS_SPHERE: -0.50
OD_SPHERE: +0.75
OD_CYLINDER: -0.50

## 2023-07-06 ASSESSMENT — REFRACTION_CURRENTRX
OS_SPHERE: -1.00
OD_SPHERE: -1.00
OS_ADD: +2.00
OS_AXIS: 180
OD_CYLINDER: -0.25
OD_AXIS: 180
OS_CYLINDER: -0.50
OD_OVR_VA: 20/
OS_OVR_VA: 20/
OS_VPRISM_DIRECTION: PROGS
OD_VPRISM_DIRECTION: PROGS
OD_ADD: +2.00

## 2023-07-06 ASSESSMENT — KERATOMETRY
METHOD_AUTO_MANUAL: AUTO
OS_K1POWER_DIOPTERS: 44.00
OD_K1POWER_DIOPTERS: 44.25
OD_AXISANGLE_DEGREES: 090
OS_AXISANGLE_DEGREES: 088
OS_K2POWER_DIOPTERS: 44.50
OD_K2POWER_DIOPTERS: 44.25

## 2023-07-06 ASSESSMENT — VISUAL ACUITY
OS_BCVA: 20/20
OD_BCVA: 20/30

## 2023-07-06 ASSESSMENT — SPHEQUIV_DERIVED
OD_SPHEQUIV: -1.375
OS_SPHEQUIV: -1
OD_SPHEQUIV: 0.5

## 2023-07-06 ASSESSMENT — CORNEAL EDEMA CLINICAL DESCRIPTION: OD_CORNEALEDEMA: T

## 2023-07-06 ASSESSMENT — REFRACTION_MANIFEST
OD_VA1: 20/80
OD_AXIS: 165
OD_SPHERE: -1.00
OD_CYLINDER: -0.75

## 2023-07-06 ASSESSMENT — CONFRONTATIONAL VISUAL FIELD TEST (CVF)
OS_FINDINGS: FULL
OD_FINDINGS: FULL

## 2023-07-06 ASSESSMENT — TONOMETRY: OS_IOP_MMHG: 20

## 2023-07-13 ENCOUNTER — OFFICE (OUTPATIENT)
Dept: URBAN - METROPOLITAN AREA CLINIC 102 | Facility: CLINIC | Age: 63
Setting detail: OPHTHALMOLOGY
End: 2023-07-13
Payer: COMMERCIAL

## 2023-07-13 DIAGNOSIS — Z96.1: ICD-10-CM

## 2023-07-13 PROBLEM — H25.12 CATARACT SENILE NUCLEAR SCLEROSIS;  , LEFT EYE: Status: ACTIVE | Noted: 2023-07-06

## 2023-07-13 PROCEDURE — 99024 POSTOP FOLLOW-UP VISIT: CPT | Performed by: OPHTHALMOLOGY

## 2023-07-13 ASSESSMENT — KERATOMETRY
OD_AXISANGLE_DEGREES: 054
OS_K2POWER_DIOPTERS: 44.25
OS_AXISANGLE_DEGREES: 091
METHOD_AUTO_MANUAL: AUTO
OS_K1POWER_DIOPTERS: 44.00
OD_K2POWER_DIOPTERS: 44.50
OD_K1POWER_DIOPTERS: 44.25

## 2023-07-13 ASSESSMENT — REFRACTION_AUTOREFRACTION
OD_CYLINDER: -0.25
OD_SPHERE: +0.50
OS_SPHERE: -0.25
OS_CYLINDER: -1.25
OS_AXIS: 180
OD_AXIS: 131

## 2023-07-13 ASSESSMENT — REFRACTION_MANIFEST
OD_AXIS: 165
OD_SPHERE: -1.00
OD_VA1: 20/80
OD_CYLINDER: -0.75

## 2023-07-13 ASSESSMENT — REFRACTION_CURRENTRX
OD_AXIS: 180
OD_CYLINDER: -0.25
OD_SPHERE: -1.00
OD_VPRISM_DIRECTION: PROGS
OS_CYLINDER: -0.50
OD_OVR_VA: 20/
OS_OVR_VA: 20/
OD_ADD: +2.00
OS_ADD: +2.00
OS_VPRISM_DIRECTION: PROGS
OS_SPHERE: -1.00
OS_AXIS: 180

## 2023-07-13 ASSESSMENT — TONOMETRY
OS_IOP_MMHG: 18
OD_IOP_MMHG: 17

## 2023-07-13 ASSESSMENT — AXIALLENGTH_DERIVED
OD_AL: 23.1335
OD_AL: 23.8082
OS_AL: 23.7038

## 2023-07-13 ASSESSMENT — VISUAL ACUITY
OD_BCVA: 20/25
OS_BCVA: 20/20

## 2023-07-13 ASSESSMENT — SPHEQUIV_DERIVED
OD_SPHEQUIV: 0.375
OS_SPHEQUIV: -0.875
OD_SPHEQUIV: -1.375

## 2023-07-13 ASSESSMENT — CONFRONTATIONAL VISUAL FIELD TEST (CVF)
OD_FINDINGS: FULL
OS_FINDINGS: FULL

## 2023-08-14 ENCOUNTER — OFFICE (OUTPATIENT)
Dept: URBAN - METROPOLITAN AREA CLINIC 102 | Facility: CLINIC | Age: 63
Setting detail: OPHTHALMOLOGY
End: 2023-08-14
Payer: COMMERCIAL

## 2023-08-14 DIAGNOSIS — Z96.1: ICD-10-CM

## 2023-08-14 PROCEDURE — 99024 POSTOP FOLLOW-UP VISIT: CPT | Performed by: OPHTHALMOLOGY

## 2023-08-14 ASSESSMENT — REFRACTION_AUTOREFRACTION
OD_SPHERE: +0.50
OD_CYLINDER: -0.25
OD_AXIS: 109
OS_SPHERE: -0.50
OS_CYLINDER: -1.00
OS_AXIS: 167

## 2023-08-14 ASSESSMENT — REFRACTION_CURRENTRX
OS_CYLINDER: -0.50
OS_ADD: +2.00
OD_SPHERE: -1.00
OD_AXIS: 180
OS_OVR_VA: 20/
OD_CYLINDER: -0.25
OS_VPRISM_DIRECTION: PROGS
OS_AXIS: 180
OD_OVR_VA: 20/
OS_SPHERE: -1.00
OD_VPRISM_DIRECTION: PROGS
OD_ADD: +2.00

## 2023-08-14 ASSESSMENT — KERATOMETRY
OS_AXISANGLE_DEGREES: 086
OS_K2POWER_DIOPTERS: 44.25
OS_K1POWER_DIOPTERS: 44.00
OD_K1POWER_DIOPTERS: 44.25
OD_AXISANGLE_DEGREES: 077
OD_K2POWER_DIOPTERS: 44.50
METHOD_AUTO_MANUAL: AUTO

## 2023-08-14 ASSESSMENT — REFRACTION_MANIFEST
OD_VA1: 20/80
OD_CYLINDER: -0.75
OD_SPHERE: -1.00
OD_AXIS: 165

## 2023-08-14 ASSESSMENT — SPHEQUIV_DERIVED
OD_SPHEQUIV: 0.375
OS_SPHEQUIV: -1
OD_SPHEQUIV: -1.375

## 2023-08-14 ASSESSMENT — CONFRONTATIONAL VISUAL FIELD TEST (CVF)
OS_FINDINGS: FULL
OD_FINDINGS: FULL

## 2023-08-14 ASSESSMENT — VISUAL ACUITY
OS_BCVA: 20/20
OD_BCVA: 20/30+2

## 2023-08-14 ASSESSMENT — AXIALLENGTH_DERIVED
OD_AL: 23.1335
OD_AL: 23.8082
OS_AL: 23.753

## 2023-08-14 ASSESSMENT — TONOMETRY
OD_IOP_MMHG: 11
OS_IOP_MMHG: 15

## 2023-08-18 ENCOUNTER — NON-APPOINTMENT (OUTPATIENT)
Age: 63
End: 2023-08-18

## 2023-11-20 ENCOUNTER — OFFICE (OUTPATIENT)
Dept: URBAN - METROPOLITAN AREA CLINIC 102 | Facility: CLINIC | Age: 63
Setting detail: OPHTHALMOLOGY
End: 2023-11-20
Payer: COMMERCIAL

## 2023-11-20 DIAGNOSIS — Z96.1: ICD-10-CM

## 2023-11-20 DIAGNOSIS — H25.12: ICD-10-CM

## 2023-11-20 DIAGNOSIS — H31.002: ICD-10-CM

## 2023-11-20 DIAGNOSIS — H43.393: ICD-10-CM

## 2023-11-20 PROCEDURE — 92014 COMPRE OPH EXAM EST PT 1/>: CPT | Performed by: OPHTHALMOLOGY

## 2023-11-20 ASSESSMENT — CONFRONTATIONAL VISUAL FIELD TEST (CVF)
OD_FINDINGS: FULL
OS_FINDINGS: FULL

## 2023-11-20 ASSESSMENT — SPHEQUIV_DERIVED
OS_SPHEQUIV: -1.375
OD_SPHEQUIV: -1.375
OD_SPHEQUIV: 0.375

## 2023-11-20 ASSESSMENT — REFRACTION_CURRENTRX
OS_AXIS: 180
OD_ADD: +2.00
OD_VPRISM_DIRECTION: PROGS
OS_CYLINDER: -0.50
OD_SPHERE: -1.00
OS_ADD: +2.00
OS_VPRISM_DIRECTION: PROGS
OS_SPHERE: -1.00
OD_OVR_VA: 20/
OD_AXIS: 180
OS_OVR_VA: 20/
OD_CYLINDER: -0.25

## 2023-11-20 ASSESSMENT — REFRACTION_AUTOREFRACTION
OS_SPHERE: -1.00
OD_AXIS: 094
OD_SPHERE: +0.50
OD_CYLINDER: -0.25
OS_CYLINDER: -0.75
OS_AXIS: 165

## 2023-11-20 ASSESSMENT — REFRACTION_MANIFEST
OD_VA1: 20/80
OD_CYLINDER: -0.75
OD_AXIS: 165
OD_SPHERE: -1.00

## 2023-12-07 ENCOUNTER — APPOINTMENT (OUTPATIENT)
Dept: OTOLARYNGOLOGY | Facility: CLINIC | Age: 63
End: 2023-12-07
Payer: COMMERCIAL

## 2023-12-07 VITALS — HEIGHT: 63 IN | BODY MASS INDEX: 28.35 KG/M2 | WEIGHT: 160 LBS

## 2023-12-07 DIAGNOSIS — J30.9 ALLERGIC RHINITIS, UNSPECIFIED: ICD-10-CM

## 2023-12-07 DIAGNOSIS — J32.0 CHRONIC MAXILLARY SINUSITIS: ICD-10-CM

## 2023-12-07 PROCEDURE — 99213 OFFICE O/P EST LOW 20 MIN: CPT | Mod: 25

## 2023-12-07 PROCEDURE — 31231 NASAL ENDOSCOPY DX: CPT

## 2023-12-07 RX ORDER — DOXYCYCLINE 100 MG/1
100 CAPSULE ORAL TWICE DAILY
Qty: 20 | Refills: 1 | Status: ACTIVE | COMMUNITY
Start: 2023-12-07 | End: 1900-01-01

## 2023-12-07 RX ORDER — PREDNISONE 10 MG/1
10 TABLET ORAL
Qty: 20 | Refills: 2 | Status: ACTIVE | COMMUNITY
Start: 2023-12-07 | End: 1900-01-01

## 2024-01-17 DIAGNOSIS — N60.02 SOLITARY CYST OF LEFT BREAST: ICD-10-CM

## 2024-02-01 ENCOUNTER — APPOINTMENT (OUTPATIENT)
Dept: OTOLARYNGOLOGY | Facility: CLINIC | Age: 64
End: 2024-02-01

## 2024-02-01 RX ORDER — PREDNISONE 10 MG/1
10 TABLET ORAL
Qty: 20 | Refills: 2 | Status: ACTIVE | COMMUNITY
Start: 2024-02-01 | End: 1900-01-01

## 2024-02-01 RX ORDER — DOXYCYCLINE 100 MG/1
100 CAPSULE ORAL TWICE DAILY
Qty: 20 | Refills: 1 | Status: ACTIVE | COMMUNITY
Start: 2024-02-01 | End: 1900-01-01

## 2024-04-24 ENCOUNTER — APPOINTMENT (OUTPATIENT)
Dept: SURGICAL ONCOLOGY | Facility: CLINIC | Age: 64
End: 2024-04-24
Payer: COMMERCIAL

## 2024-04-24 VITALS
HEIGHT: 63 IN | DIASTOLIC BLOOD PRESSURE: 78 MMHG | WEIGHT: 160 LBS | HEART RATE: 88 BPM | BODY MASS INDEX: 28.35 KG/M2 | RESPIRATION RATE: 16 BRPM | SYSTOLIC BLOOD PRESSURE: 125 MMHG | OXYGEN SATURATION: 98 %

## 2024-04-24 DIAGNOSIS — R92.30 DENSE BREASTS, UNSPECIFIED: ICD-10-CM

## 2024-04-24 PROCEDURE — 99213 OFFICE O/P EST LOW 20 MIN: CPT

## 2024-04-24 NOTE — PHYSICAL EXAM
[Normal] : supple, no neck mass and thyroid not enlarged [Normal Axillary Lymph Nodes] : normal axillary lymph nodes [Normal Supraclavicular Lymph Nodes] : normal supraclavicular lymph nodes [Normal] : grossly intact [FreeTextEntry1] : AB present during exam  [de-identified] : Normal S1, S2. Regular rate and rhythm. [de-identified] : Complete breast exam performed in supine and upright positions. No palpable masses, tenderness, nipple discharge, inversion, deviation or enlarged axillary or supraclavicular lymph nodes.  [de-identified] : Clear breath sounds bilaterally, normal respiratory effort.

## 2024-04-24 NOTE — ASSESSMENT
[FreeTextEntry1] : Impression:  Dense breasts Mammo/sono 8/2022- BIRADS 3- f/u in 6 months   MMG/US (right breast) 3/2023- BI-RADS 3- return yearly  No suspicious lesions on exam.  Plan: Continue yearly surveillance MMG/US Aug 2024   All medical entries were at my, Dr. Timi Britt, direction. I have reviewed the chart and agree that the record accurately reflects my personal performance of the history, physical exam, assessment and plan. Our office Nurse Practitioner was present of the duration of the office visit.

## 2024-04-24 NOTE — HISTORY OF PRESENT ILLNESS
[de-identified] : Ms. WANDER KURTZ is a 63-year-old female presenting for a follow up visit.  History of extremely dense breasts and multiple cyst aspirations.   She underwent a bilateral mammo/sono Aug 2022 @ [facility] which was suggestive of summation of shadows within the posterior and central margin of the right breast. Negative US. 6 month follow up (BIRADS 3)  Right MMG/US on 2/21/23 shows resolution of previously noted oval asymmetry in the right breast. BI-RADS 3 - yearly MMG/US  B/L mammo/sono 8/2023 - BIRADS 2   Denies palpable breast masses, nipple discharge, skin changes, inversion or breast pain. Denies constitutional symptoms. Family history of breast cancer in her mother in her late 70s.

## 2024-04-29 ENCOUNTER — APPOINTMENT (OUTPATIENT)
Dept: OTOLARYNGOLOGY | Facility: CLINIC | Age: 64
End: 2024-04-29
Payer: COMMERCIAL

## 2024-04-29 VITALS — HEIGHT: 62 IN | BODY MASS INDEX: 29.44 KG/M2 | WEIGHT: 160 LBS

## 2024-04-29 DIAGNOSIS — H90.3 SENSORINEURAL HEARING LOSS, BILATERAL: ICD-10-CM

## 2024-04-29 DIAGNOSIS — H69.93 UNSPECIFIED EUSTACHIAN TUBE DISORDER, BILATERAL: ICD-10-CM

## 2024-04-29 DIAGNOSIS — J34.2 DEVIATED NASAL SEPTUM: ICD-10-CM

## 2024-04-29 PROCEDURE — 92567 TYMPANOMETRY: CPT

## 2024-04-29 PROCEDURE — 92557 COMPREHENSIVE HEARING TEST: CPT

## 2024-04-29 PROCEDURE — 99213 OFFICE O/P EST LOW 20 MIN: CPT

## 2024-04-29 NOTE — REASON FOR VISIT
[Subsequent Evaluation] : a subsequent evaluation for [Sinusitis] : sinusitis [FreeTextEntry2] : hearing

## 2024-04-29 NOTE — HISTORY OF PRESENT ILLNESS
[de-identified] : hx  hearing loss managing well seasonal allergy symptoms using azelastine and daily zyrtec w good results

## 2024-04-29 NOTE — ASSESSMENT
[FreeTextEntry1] : deviated septum allergic rhinitis audio au moderate loss [id number] hz managing well f/u 1 y

## 2024-05-20 ENCOUNTER — OFFICE (OUTPATIENT)
Dept: URBAN - METROPOLITAN AREA CLINIC 102 | Facility: CLINIC | Age: 64
Setting detail: OPHTHALMOLOGY
End: 2024-05-20
Payer: COMMERCIAL

## 2024-05-20 DIAGNOSIS — Z96.1: ICD-10-CM

## 2024-05-20 DIAGNOSIS — H31.002: ICD-10-CM

## 2024-05-20 DIAGNOSIS — H43.393: ICD-10-CM

## 2024-05-20 DIAGNOSIS — H25.12: ICD-10-CM

## 2024-05-20 PROCEDURE — 92134 CPTRZ OPH DX IMG PST SGM RTA: CPT | Performed by: OPHTHALMOLOGY

## 2024-05-20 PROCEDURE — 92014 COMPRE OPH EXAM EST PT 1/>: CPT | Performed by: OPHTHALMOLOGY

## 2024-05-20 ASSESSMENT — CONFRONTATIONAL VISUAL FIELD TEST (CVF)
OD_FINDINGS: FULL
OS_FINDINGS: FULL

## 2024-07-10 ENCOUNTER — OFFICE (OUTPATIENT)
Dept: URBAN - METROPOLITAN AREA CLINIC 70 | Facility: CLINIC | Age: 64
Setting detail: OPHTHALMOLOGY
End: 2024-07-10
Payer: COMMERCIAL

## 2024-07-10 DIAGNOSIS — H01.001: ICD-10-CM

## 2024-07-10 DIAGNOSIS — H00.12: ICD-10-CM

## 2024-07-10 DIAGNOSIS — H01.005: ICD-10-CM

## 2024-07-10 DIAGNOSIS — H01.002: ICD-10-CM

## 2024-07-10 DIAGNOSIS — H01.004: ICD-10-CM

## 2024-07-10 PROCEDURE — 92012 INTRM OPH EXAM EST PATIENT: CPT | Performed by: OPHTHALMOLOGY

## 2024-07-10 ASSESSMENT — CONFRONTATIONAL VISUAL FIELD TEST (CVF)
OD_FINDINGS: FULL
OS_FINDINGS: FULL

## 2024-07-10 ASSESSMENT — LID EXAM ASSESSMENTS: OD_BLEPHARITIS: 1+

## 2024-07-30 ENCOUNTER — OFFICE (OUTPATIENT)
Dept: URBAN - METROPOLITAN AREA CLINIC 102 | Facility: CLINIC | Age: 64
Setting detail: OPHTHALMOLOGY
End: 2024-07-30
Payer: COMMERCIAL

## 2024-07-30 DIAGNOSIS — H01.004: ICD-10-CM

## 2024-07-30 DIAGNOSIS — H00.12: ICD-10-CM

## 2024-07-30 DIAGNOSIS — H01.002: ICD-10-CM

## 2024-07-30 DIAGNOSIS — H01.005: ICD-10-CM

## 2024-07-30 DIAGNOSIS — H01.001: ICD-10-CM

## 2024-07-30 PROCEDURE — 99213 OFFICE O/P EST LOW 20 MIN: CPT | Performed by: OPHTHALMOLOGY

## 2024-07-30 ASSESSMENT — CONFRONTATIONAL VISUAL FIELD TEST (CVF)
OS_FINDINGS: FULL
OD_FINDINGS: FULL

## 2024-07-30 ASSESSMENT — LID EXAM ASSESSMENTS
OD_BLEPHARITIS: RLL RUL T
OS_BLEPHARITIS: LLL LUL T

## 2024-11-15 ENCOUNTER — APPOINTMENT (OUTPATIENT)
Dept: GASTROENTEROLOGY | Facility: CLINIC | Age: 64
End: 2024-11-15

## 2025-01-29 ENCOUNTER — NON-APPOINTMENT (OUTPATIENT)
Age: 65
End: 2025-01-29

## 2025-01-29 ENCOUNTER — APPOINTMENT (OUTPATIENT)
Dept: OTOLARYNGOLOGY | Facility: CLINIC | Age: 65
End: 2025-01-29
Payer: COMMERCIAL

## 2025-01-29 VITALS — WEIGHT: 155 LBS | HEIGHT: 64 IN | BODY MASS INDEX: 26.46 KG/M2

## 2025-01-29 DIAGNOSIS — J32.2 CHRONIC ETHMOIDAL SINUSITIS: ICD-10-CM

## 2025-01-29 DIAGNOSIS — J34.2 DEVIATED NASAL SEPTUM: ICD-10-CM

## 2025-01-29 DIAGNOSIS — R05.3 CHRONIC COUGH: ICD-10-CM

## 2025-01-29 PROCEDURE — 31231 NASAL ENDOSCOPY DX: CPT

## 2025-01-29 PROCEDURE — 99213 OFFICE O/P EST LOW 20 MIN: CPT | Mod: 25

## 2025-01-29 RX ORDER — PREDNISONE 10 MG/1
10 TABLET ORAL
Qty: 18 | Refills: 2 | Status: ACTIVE | COMMUNITY
Start: 2025-01-29 | End: 1900-01-01

## 2025-01-29 RX ORDER — DOXYCYCLINE 100 MG/1
100 CAPSULE ORAL TWICE DAILY
Qty: 28 | Refills: 1 | Status: ACTIVE | COMMUNITY
Start: 2025-01-29 | End: 1900-01-01

## 2025-02-05 RX ORDER — HYDROCODONE BITARTRATE AND HOMATROPINE METHYLBROMIDE 1.5; 5 MG/5ML; MG/5ML
5-1.5 SOLUTION ORAL
Qty: 300 | Refills: 0 | Status: ACTIVE | COMMUNITY
Start: 2025-02-05 | End: 1900-01-01

## 2025-02-21 ENCOUNTER — APPOINTMENT (OUTPATIENT)
Dept: OTOLARYNGOLOGY | Facility: CLINIC | Age: 65
End: 2025-02-21

## 2025-04-25 ENCOUNTER — NON-APPOINTMENT (OUTPATIENT)
Age: 65
End: 2025-04-25

## 2025-04-25 ENCOUNTER — APPOINTMENT (OUTPATIENT)
Dept: OTOLARYNGOLOGY | Facility: CLINIC | Age: 65
End: 2025-04-25
Payer: COMMERCIAL

## 2025-04-25 ENCOUNTER — APPOINTMENT (OUTPATIENT)
Dept: ORTHOPEDIC SURGERY | Facility: CLINIC | Age: 65
End: 2025-04-25
Payer: COMMERCIAL

## 2025-04-25 VITALS — BODY MASS INDEX: 25.61 KG/M2 | HEIGHT: 64 IN | WEIGHT: 150 LBS

## 2025-04-25 VITALS — BODY MASS INDEX: 25.61 KG/M2 | WEIGHT: 150 LBS | HEIGHT: 64 IN

## 2025-04-25 DIAGNOSIS — Z91.09 OTHER ALLERGY STATUS, OTHER THAN TO DRUGS AND BIOLOGICAL SUBSTANCES: ICD-10-CM

## 2025-04-25 DIAGNOSIS — M51.369: ICD-10-CM

## 2025-04-25 PROCEDURE — 99213 OFFICE O/P EST LOW 20 MIN: CPT

## 2025-04-25 PROCEDURE — 99204 OFFICE O/P NEW MOD 45 MIN: CPT

## 2025-05-12 ENCOUNTER — APPOINTMENT (OUTPATIENT)
Dept: SURGICAL ONCOLOGY | Facility: CLINIC | Age: 65
End: 2025-05-12
Payer: COMMERCIAL

## 2025-05-12 VITALS — DIASTOLIC BLOOD PRESSURE: 70 MMHG | SYSTOLIC BLOOD PRESSURE: 118 MMHG

## 2025-05-12 DIAGNOSIS — N60.02 SOLITARY CYST OF LEFT BREAST: ICD-10-CM

## 2025-05-12 PROCEDURE — 99213 OFFICE O/P EST LOW 20 MIN: CPT

## 2025-05-19 ENCOUNTER — APPOINTMENT (OUTPATIENT)
Dept: OTOLARYNGOLOGY | Facility: CLINIC | Age: 65
End: 2025-05-19
Payer: COMMERCIAL

## 2025-05-19 VITALS — HEIGHT: 64 IN | WEIGHT: 155 LBS | BODY MASS INDEX: 26.46 KG/M2

## 2025-05-19 DIAGNOSIS — J37.0 CHRONIC LARYNGITIS: ICD-10-CM

## 2025-05-19 DIAGNOSIS — R05.3 CHRONIC COUGH: ICD-10-CM

## 2025-05-19 DIAGNOSIS — R49.0 DYSPHONIA: ICD-10-CM

## 2025-05-19 PROCEDURE — 99213 OFFICE O/P EST LOW 20 MIN: CPT | Mod: 25

## 2025-05-19 PROCEDURE — 31575 DIAGNOSTIC LARYNGOSCOPY: CPT

## 2025-05-19 RX ORDER — PREDNISONE 10 MG/1
10 TABLET ORAL TWICE DAILY
Qty: 15 | Refills: 2 | Status: ACTIVE | COMMUNITY
Start: 2025-05-19 | End: 1900-01-01

## 2025-06-02 ENCOUNTER — OFFICE (OUTPATIENT)
Dept: URBAN - METROPOLITAN AREA CLINIC 102 | Facility: CLINIC | Age: 65
Setting detail: OPHTHALMOLOGY
End: 2025-06-02
Payer: COMMERCIAL

## 2025-06-02 DIAGNOSIS — H25.12: ICD-10-CM

## 2025-06-02 DIAGNOSIS — H43.393: ICD-10-CM

## 2025-06-02 DIAGNOSIS — Z96.1: ICD-10-CM

## 2025-06-02 DIAGNOSIS — H31.002: ICD-10-CM

## 2025-06-02 DIAGNOSIS — H16.223: ICD-10-CM

## 2025-06-02 PROCEDURE — 99214 OFFICE O/P EST MOD 30 MIN: CPT | Performed by: OPHTHALMOLOGY

## 2025-06-02 ASSESSMENT — REFRACTION_MANIFEST
OD_SPHERE: -1.00
OS_SPHERE: -0.50
OS_CYLINDER: -1.50
OD_AXIS: 102
OD_CYLINDER: -0.75
OD_AXIS: 165
OD_CYLINDER: -0.50
OD_VA1: 20/80
OS_VA1: 20/70
OD_SPHERE: +0.25
OS_AXIS: 180

## 2025-06-02 ASSESSMENT — CONFRONTATIONAL VISUAL FIELD TEST (CVF)
OD_FINDINGS: FULL
OS_FINDINGS: FULL

## 2025-06-02 ASSESSMENT — REFRACTION_CURRENTRX
OS_CYLINDER: -0.50
OD_VPRISM_DIRECTION: PROGS
OS_SPHERE: -1.00
OD_CYLINDER: -0.25
OD_ADD: +2.00
OS_ADD: +2.00
OS_AXIS: 180
OS_OVR_VA: 20/
OD_OVR_VA: 20/
OD_SPHERE: -1.00
OD_AXIS: 180
OS_VPRISM_DIRECTION: PROGS

## 2025-06-02 ASSESSMENT — VISUAL ACUITY
OS_BCVA: 20/20
OD_BCVA: 20/100

## 2025-06-02 ASSESSMENT — KERATOMETRY
OS_AXISANGLE_DEGREES: 82
METHOD_AUTO_MANUAL: AUTO
OD_K1POWER_DIOPTERS: 44.50
OD_K2POWER_DIOPTERS: 44.75
OS_K2POWER_DIOPTERS: 44.25
OD_AXISANGLE_DEGREES: 100
OS_K1POWER_DIOPTERS: 44.00

## 2025-06-02 ASSESSMENT — TONOMETRY
OS_IOP_MMHG: 20
OD_IOP_MMHG: 15

## 2025-06-02 ASSESSMENT — REFRACTION_AUTOREFRACTION
OS_SPHERE: -0.25
OS_CYLINDER: -1.50
OD_AXIS: 102
OS_AXIS: 177
OD_CYLINDER: -0.50
OD_SPHERE: +0.25

## 2025-07-18 ENCOUNTER — OFFICE (OUTPATIENT)
Dept: URBAN - METROPOLITAN AREA CLINIC 102 | Facility: CLINIC | Age: 65
Setting detail: OPHTHALMOLOGY
End: 2025-07-18
Payer: COMMERCIAL

## 2025-07-18 DIAGNOSIS — H00.024: ICD-10-CM

## 2025-07-18 PROCEDURE — 99213 OFFICE O/P EST LOW 20 MIN: CPT | Performed by: OPTOMETRIST

## 2025-07-18 ASSESSMENT — REFRACTION_CURRENTRX
OD_SPHERE: -1.00
OD_VPRISM_DIRECTION: PROGS
OS_AXIS: 180
OS_CYLINDER: -0.50
OS_SPHERE: -1.00
OS_VPRISM_DIRECTION: PROGS
OS_OVR_VA: 20/
OD_ADD: +2.00
OS_ADD: +2.00
OD_OVR_VA: 20/
OD_CYLINDER: -0.25
OD_AXIS: 180

## 2025-07-18 ASSESSMENT — REFRACTION_MANIFEST
OS_CYLINDER: -1.50
OD_SPHERE: +0.25
OD_VA1: 20/80
OD_AXIS: 165
OD_AXIS: 102
OD_CYLINDER: -0.75
OD_CYLINDER: -0.50
OS_AXIS: 180
OS_SPHERE: -0.50
OD_SPHERE: -1.00
OS_VA1: 20/70

## 2025-07-18 ASSESSMENT — KERATOMETRY
OS_AXISANGLE_DEGREES: 062
OS_K2POWER_DIOPTERS: 44.50
METHOD_AUTO_MANUAL: AUTO
OD_K1POWER_DIOPTERS: 44.75
OD_AXISANGLE_DEGREES: 048
OS_K1POWER_DIOPTERS: 44.25
OD_K2POWER_DIOPTERS: 45.00

## 2025-07-18 ASSESSMENT — VISUAL ACUITY
OD_BCVA: 20/100
OS_BCVA: 20/20

## 2025-07-18 ASSESSMENT — REFRACTION_AUTOREFRACTION
OS_SPHERE: -1.00
OD_SPHERE: +0.25
OD_AXIS: 109
OD_CYLINDER: -0.25
OS_AXIS: 148
OS_CYLINDER: -1.25

## 2025-07-18 ASSESSMENT — TONOMETRY
OD_IOP_MMHG: 10
OS_IOP_MMHG: 15

## 2025-07-22 ENCOUNTER — OFFICE (OUTPATIENT)
Dept: URBAN - METROPOLITAN AREA CLINIC 102 | Facility: CLINIC | Age: 65
Setting detail: OPHTHALMOLOGY
End: 2025-07-22
Payer: COMMERCIAL

## 2025-07-22 DIAGNOSIS — H25.12: ICD-10-CM

## 2025-07-22 DIAGNOSIS — H31.002: ICD-10-CM

## 2025-07-22 DIAGNOSIS — Z96.1: ICD-10-CM

## 2025-07-22 DIAGNOSIS — H43.393: ICD-10-CM

## 2025-07-22 DIAGNOSIS — H16.223: ICD-10-CM

## 2025-07-22 PROCEDURE — 99213 OFFICE O/P EST LOW 20 MIN: CPT | Performed by: OPHTHALMOLOGY

## 2025-07-22 PROCEDURE — 92136 OPHTHALMIC BIOMETRY: CPT | Mod: 26,LT | Performed by: OPHTHALMOLOGY

## 2025-07-22 ASSESSMENT — VISUAL ACUITY
OD_BCVA: 20/100
OS_BCVA: 20/20-1

## 2025-07-22 ASSESSMENT — REFRACTION_CURRENTRX
OS_ADD: +2.00
OS_AXIS: 180
OD_AXIS: 180
OD_VPRISM_DIRECTION: PROGS
OD_ADD: +2.00
OD_SPHERE: -1.00
OS_SPHERE: -1.00
OS_OVR_VA: 20/
OD_CYLINDER: -0.25
OS_CYLINDER: -0.50
OS_VPRISM_DIRECTION: PROGS
OD_OVR_VA: 20/

## 2025-07-22 ASSESSMENT — KERATOMETRY
OD_CYLPOWER_DEGREES: 0.25
OS_K2POWER_DIOPTERS: 44.50
OD_AXISANGLE2_DEGREES: 025
OD_K1POWER_DIOPTERS: 44.75
OS_CYLAXISANGLE_DEGREES: 076
OS_AXISANGLE2_DEGREES: 076
OD_CYLAXISANGLE_DEGREES: 025
OS_K2POWER_DIOPTERS: 44.50
OS_AXISANGLE_DEGREES: 166
METHOD_AUTO_MANUAL: AUTO
OD_AXISANGLE_DEGREES: 115
OS_AXISANGLE_DEGREES: 076
OS_K1POWER_DIOPTERS: 44.25
OS_K1POWER_DIOPTERS: 44.25
OD_AXISANGLE_DEGREES: 025
OD_K1POWER_DIOPTERS: 44.75
OD_K2POWER_DIOPTERS: 45.00
OD_K1K2_AVERAGE: 44.875
OD_K2POWER_DIOPTERS: 45.00
OS_CYLPOWER_DEGREES: 0.25
OS_K1K2_AVERAGE: 44.375

## 2025-07-22 ASSESSMENT — CONFRONTATIONAL VISUAL FIELD TEST (CVF)
OD_FINDINGS: FULL
OS_FINDINGS: FULL

## 2025-07-22 ASSESSMENT — REFRACTION_AUTOREFRACTION
OS_AXIS: 168
OD_CYLINDER: -0.50
OS_SPHERE: -0.75
OD_SPHERE: +0.25
OD_AXIS: 110
OS_CYLINDER: -1.00

## 2025-07-22 ASSESSMENT — REFRACTION_MANIFEST
OD_AXIS: 102
OD_SPHERE: -1.00
OS_CYLINDER: -1.50
OD_CYLINDER: -0.75
OD_VA1: 20/80
OD_SPHERE: +0.25
OS_AXIS: 180
OD_CYLINDER: -0.50
OS_SPHERE: -0.50
OD_AXIS: 165
OS_VA1: 20/70

## 2025-07-22 ASSESSMENT — TONOMETRY
OD_IOP_MMHG: 10
OS_IOP_MMHG: 16

## 2025-08-13 ENCOUNTER — AMBULATORY SURGERY (OUTPATIENT)
Dept: URBAN - METROPOLITAN AREA SURGERY 14 | Facility: SURGERY | Age: 65
Setting detail: OPHTHALMOLOGY
End: 2025-08-13
Payer: COMMERCIAL

## 2025-08-13 DIAGNOSIS — H25.12: ICD-10-CM

## 2025-08-13 PROCEDURE — 66984 XCAPSL CTRC RMVL W/O ECP: CPT | Mod: LT | Performed by: OPHTHALMOLOGY

## 2025-08-13 ASSESSMENT — CORNEAL EDEMA CLINICAL DESCRIPTION: OS_CORNEALEDEMA: T

## 2025-08-14 ENCOUNTER — OFFICE (OUTPATIENT)
Dept: URBAN - METROPOLITAN AREA CLINIC 102 | Facility: CLINIC | Age: 65
Setting detail: OPHTHALMOLOGY
End: 2025-08-14
Payer: COMMERCIAL

## 2025-08-14 DIAGNOSIS — Z96.1: ICD-10-CM

## 2025-08-14 PROCEDURE — 99024 POSTOP FOLLOW-UP VISIT: CPT | Performed by: OPHTHALMOLOGY

## 2025-08-14 ASSESSMENT — REFRACTION_CURRENTRX
OD_SPHERE: -1.00
OD_ADD: +2.00
OS_ADD: +2.00
OD_OVR_VA: 20/
OS_SPHERE: -1.00
OD_CYLINDER: -0.25
OS_VPRISM_DIRECTION: PROGS
OD_VPRISM_DIRECTION: PROGS
OS_CYLINDER: -0.50
OS_AXIS: 180
OS_OVR_VA: 20/
OD_AXIS: 180

## 2025-08-14 ASSESSMENT — REFRACTION_MANIFEST
OS_SPHERE: -0.50
OD_AXIS: 165
OS_VA1: 20/70
OD_AXIS: 102
OD_VA1: 20/80
OD_CYLINDER: -0.50
OD_CYLINDER: -0.75
OD_SPHERE: +0.25
OD_SPHERE: -1.00
OS_CYLINDER: -1.50
OS_AXIS: 180

## 2025-08-14 ASSESSMENT — KERATOMETRY
OS_AXISANGLE_DEGREES: 076
OD_AXISANGLE_DEGREES: 086
OD_K2POWER_DIOPTERS: 44.75
OS_K1POWER_DIOPTERS: 43.75
METHOD_AUTO_MANUAL: AUTO
OS_K2POWER_DIOPTERS: 44.50
OD_K1POWER_DIOPTERS: 44.50

## 2025-08-14 ASSESSMENT — VISUAL ACUITY
OD_BCVA: 20/30-1
OS_BCVA: 20/20

## 2025-08-14 ASSESSMENT — REFRACTION_AUTOREFRACTION
OD_SPHERE: 0.00
OS_AXIS: 162
OD_AXIS: 096
OD_CYLINDER: -0.25
OS_SPHERE: +0.50
OS_CYLINDER: -0.75

## 2025-08-14 ASSESSMENT — TONOMETRY
OD_IOP_MMHG: 12
OS_IOP_MMHG: 19

## 2025-08-14 ASSESSMENT — CONFRONTATIONAL VISUAL FIELD TEST (CVF)
OS_FINDINGS: FULL
OD_FINDINGS: FULL

## 2025-08-21 ENCOUNTER — OFFICE (OUTPATIENT)
Dept: URBAN - METROPOLITAN AREA CLINIC 102 | Facility: CLINIC | Age: 65
Setting detail: OPHTHALMOLOGY
End: 2025-08-21
Payer: COMMERCIAL

## 2025-08-21 ENCOUNTER — RX ONLY (RX ONLY)
Age: 65
End: 2025-08-21

## 2025-08-21 DIAGNOSIS — Z96.1: ICD-10-CM

## 2025-08-21 PROCEDURE — 99024 POSTOP FOLLOW-UP VISIT: CPT | Performed by: OPHTHALMOLOGY

## 2025-08-21 ASSESSMENT — REFRACTION_CURRENTRX
OD_AXIS: 180
OD_VPRISM_DIRECTION: PROGS
OS_OVR_VA: 20/
OD_ADD: +2.00
OS_VPRISM_DIRECTION: PROGS
OS_ADD: +2.00
OD_OVR_VA: 20/
OD_SPHERE: -1.00
OS_SPHERE: -1.00
OS_CYLINDER: -0.50
OS_AXIS: 180
OD_CYLINDER: -0.25

## 2025-08-21 ASSESSMENT — REFRACTION_MANIFEST
OD_SPHERE: -1.00
OS_VA1: 20/70
OD_AXIS: 165
OS_AXIS: 180
OD_AXIS: 102
OD_CYLINDER: -0.75
OD_VA1: 20/80
OD_SPHERE: +0.25
OD_CYLINDER: -0.50
OS_SPHERE: -0.50
OS_CYLINDER: -1.50

## 2025-08-21 ASSESSMENT — KERATOMETRY
OD_K1POWER_DIOPTERS: 44.50
OD_K2POWER_DIOPTERS: 44.75
OS_K2POWER_DIOPTERS: 44.25
OS_K1POWER_DIOPTERS: 44.00
METHOD_AUTO_MANUAL: AUTO
OS_AXISANGLE_DEGREES: 82
OD_AXISANGLE_DEGREES: 112

## 2025-08-21 ASSESSMENT — REFRACTION_AUTOREFRACTION
OS_CYLINDER: -0.50
OD_AXIS: 111
OS_AXIS: 159
OD_CYLINDER: -0.25
OS_SPHERE: +0.50
OD_SPHERE: +0.25

## 2025-08-21 ASSESSMENT — CONFRONTATIONAL VISUAL FIELD TEST (CVF)
OD_FINDINGS: FULL
OS_FINDINGS: FULL

## 2025-08-21 ASSESSMENT — TONOMETRY
OS_IOP_MMHG: 12
OD_IOP_MMHG: 11

## 2025-08-21 ASSESSMENT — VISUAL ACUITY
OD_BCVA: 20/20-1
OS_BCVA: 20/20-1

## 2025-09-04 ENCOUNTER — NON-APPOINTMENT (OUTPATIENT)
Age: 65
End: 2025-09-04